# Patient Record
Sex: FEMALE | Race: BLACK OR AFRICAN AMERICAN | NOT HISPANIC OR LATINO | Employment: FULL TIME | URBAN - METROPOLITAN AREA
[De-identification: names, ages, dates, MRNs, and addresses within clinical notes are randomized per-mention and may not be internally consistent; named-entity substitution may affect disease eponyms.]

---

## 2024-05-09 ENCOUNTER — OFFICE VISIT (OUTPATIENT)
Dept: DERMATOLOGY | Facility: CLINIC | Age: 34
End: 2024-05-09
Payer: COMMERCIAL

## 2024-05-09 DIAGNOSIS — L73.2 HIDRADENITIS SUPPURATIVA: Primary | ICD-10-CM

## 2024-05-09 DIAGNOSIS — L20.9 ATOPIC DERMATITIS, UNSPECIFIED TYPE: ICD-10-CM

## 2024-05-09 PROCEDURE — 99204 OFFICE O/P NEW MOD 45 MIN: CPT | Performed by: NURSE PRACTITIONER

## 2024-05-09 RX ORDER — SPIRONOLACTONE 100 MG/1
100 TABLET, FILM COATED ORAL DAILY
Qty: 90 TABLET | Refills: 3 | Status: SHIPPED | OUTPATIENT
Start: 2024-05-09 | End: 2025-05-09

## 2024-05-09 RX ORDER — LEVONORGESTREL/ETHIN.ESTRADIOL 0.1-0.02MG
1 TABLET ORAL
COMMUNITY
Start: 2024-04-11

## 2024-05-09 RX ORDER — PROPRANOLOL HYDROCHLORIDE 80 MG/1
80 CAPSULE, EXTENDED RELEASE ORAL
COMMUNITY
Start: 2024-03-26 | End: 2024-06-24

## 2024-05-09 RX ORDER — FLUTICASONE PROPIONATE 50 MCG
SPRAY, SUSPENSION (ML) NASAL
COMMUNITY

## 2024-05-09 RX ORDER — TRIAMCINOLONE ACETONIDE 1 MG/G
CREAM TOPICAL 2 TIMES DAILY PRN
Qty: 80 G | Refills: 1 | Status: SHIPPED | OUTPATIENT
Start: 2024-05-09

## 2024-05-09 RX ORDER — LEVOTHYROXINE SODIUM 125 UG/1
250 TABLET ORAL DAILY
COMMUNITY

## 2024-05-09 RX ORDER — ALBUTEROL SULFATE 90 UG/1
2 AEROSOL, METERED RESPIRATORY (INHALATION) EVERY 4 HOURS PRN
COMMUNITY

## 2024-05-09 RX ORDER — DOXYCYCLINE HYCLATE 100 MG
100 TABLET ORAL 2 TIMES DAILY
Qty: 120 TABLET | Refills: 0 | Status: SHIPPED | OUTPATIENT
Start: 2024-05-09 | End: 2024-06-04

## 2024-05-09 RX ORDER — BENZOYL PEROXIDE 100 MG/ML
1 LIQUID TOPICAL DAILY
Qty: 237 G | Refills: 11 | Status: SHIPPED | OUTPATIENT
Start: 2024-05-09 | End: 2025-05-09

## 2024-05-09 RX ORDER — CLINDAMYCIN PHOSPHATE 10 UG/ML
LOTION TOPICAL 2 TIMES DAILY
Qty: 60 ML | Refills: 1 | Status: SHIPPED | OUTPATIENT
Start: 2024-05-09

## 2024-05-09 NOTE — PROGRESS NOTES
Subjective     Karo Friend is a 33 y.o. female who presents for the following: Hidradenitis Suppurativa, Eczema, and Seborrheic Dermatitis.       New patient visit patient in for hidradenitis suppurativa to axilla groin and breasts present off-and-on for years patient has tried clindamycin in the past states today that she is flaring in her axilla.    Patient states that she has had a history of allergies and has experienced eczema to hands and has tried triamcinolone in the past nothing currently.  Patient also describes itchy flaky scalp for 3 months has tried head and shoulders.        Review of Systems:  No other skin or systemic complaints other than what is documented elsewhere in the note.    The following portions of the chart were reviewed this encounter and updated as appropriate:       Skin Cancer History  No skin cancer on file.    Specialty Problems    None    Past Medical History:  Karo Friend  has a past medical history of Other conditions influencing health status.    Past Surgical History:  Karo Friend  has no past surgical history on file.    Family History:  Patient family history is not on file.    Social History:  Karo Friend  has no history on file for tobacco use, alcohol use, and drug use.    Allergies:  Patient has no known allergies.    Current Medications / CAM's:    Current Outpatient Medications:     levonorgestreL-ethinyl estrad (Aviane, Alesse, Lessina) 0.1-20 mg-mcg tablet, Take 1 tablet by mouth once daily., Disp: , Rfl:     propranolol LA (Inderal LA) 80 mg 24 hr capsule, Take 1 capsule (80 mg) by mouth once daily., Disp: , Rfl:     albuterol 90 mcg/actuation inhaler, Inhale 2 puffs every 4 hours if needed for wheezing or shortness of breath., Disp: , Rfl:     benzoyl peroxide (Benzac AC) 10 % external wash, Apply 1 Application topically once daily., Disp: 237 g, Rfl: 11    clindamycin (Cleocin T) 1 % lotion, Apply topically 2 times a day., Disp: 60 mL, Rfl: 1     "doxycycline (Vibra-Tabs) 100 mg tablet, Take 1 tablet (100 mg) by mouth 2 times a day. Take with a full glass of water and do not lie down for at least 30 minutes after., Disp: 120 tablet, Rfl: 0    fluticasone (Flonase) 50 mcg/actuation nasal spray, USE 1 SPRAY IN EACH NOSTRIL DAILY AT BEDTIME., Disp: , Rfl:     levothyroxine (Synthroid, Levoxyl) 125 mcg tablet, Take 2 tablets (250 mcg) by mouth once daily., Disp: , Rfl:     spironolactone (Aldactone) 100 mg tablet, Take 1 tablet (100 mg) by mouth once daily., Disp: 90 tablet, Rfl: 3    triamcinolone (Kenalog) 0.1 % cream, Apply topically 2 times a day as needed for rash., Disp: 80 g, Rfl: 1     Objective   Well appearing patient in no apparent distress; mood and affect are within normal limits.    Assessment/Plan   1. Hidradenitis suppurativa  Left Abdomen (side) - Lower, Left Axilla, Left Inframammary Fold, Pubic, Right Abdomen (side) - Lower, Right Axilla, Right Breast   Recurring tender, erythematous nodules, pustules, and abscesses that occur weeks or months apart. Disease progression with formation of tunnels, scars, and chronic purulent drainage can occur.    Hidradenitis suppurativa (HS), or acne inversa, is a chronic destructive inflammatory disorder of the hair follicles. While the pathogenesis is unclear, it is thought that follicular rupture initiates interaction between the follicular microbiome and innate immune system, triggering an inflammatory response.    HS is seen most commonly on the buttocks, breasts, groin, and axillae. Usually, the onset of the disease occurs soon after puberty, and patients typically report recurring \"boils.\" Pain is consistently reported as the most bothersome symptom, but itching, drainage, odor, fatigue, and arthralgias are frequent additional concerns.    Mechanical irritation such as by friction from tight clothing or shaving is often reported as a trigger. For many women, the week before menses can trigger disease " flares, and pregnancy and the postpartum period can be associated with either disease improvement or flaring.    Obesity and cigarette smoking are associated with HS severity. Prevalence of metabolic syndrome, major cardiovascular events, cardiac death, and diabetes (type 1 or type 2) are increased in HS compared to the general populations. Some of the most frequently associated comorbidities are related to mental health, and depression, anxiety, and risk of suicide are major burdens for the population.     A positive family history is reported in 30%-50% of patients.     PLAN:    Related Medications  spironolactone (Aldactone) 100 mg tablet  Take 1 tablet (100 mg) by mouth once daily.    benzoyl peroxide (Benzac AC) 10 % external wash  Apply 1 Application topically once daily.    clindamycin (Cleocin T) 1 % lotion  Apply topically 2 times a day.    doxycycline (Vibra-Tabs) 100 mg tablet  Take 1 tablet (100 mg) by mouth 2 times a day. Take with a full glass of water and do not lie down for at least 30 minutes after.    2. Atopic dermatitis, unspecified type  Erythematous scaly papules to bilateral hands as needed    Plan: Counseling.  I counseled the patient regarding the following:  Skin care: Patient should bathe using lukewarm water with a mild cleanser and moisturize immediately after. Emollients should be applied at least  2-3 times daily. Avoid scented detergents or fabric softeners. Keep fingernai ls short. Avoid excessive hand washing.  Expectations : The patient is aware that eczema is chronic in nature and can improve with moisturizers and topical steroids and worsen with stress,  scented soaps, detergents, scratching, dry skin, changes in weather and skin infections.  Contact office if:  Eczema worsens or fails to improve despite several weeks of treatment; patient develops skin infections (such as:yellow honey  colored crusts or cold sores).    PLAN:    triamcinolone (Kenalog) 0.1 % cream  Apply  topically 2 times a day as needed for rash.

## 2024-06-03 DIAGNOSIS — L73.2 HIDRADENITIS SUPPURATIVA: ICD-10-CM

## 2024-06-04 RX ORDER — DOXYCYCLINE HYCLATE 100 MG
100 TABLET ORAL 2 TIMES DAILY
Qty: 120 TABLET | Refills: 0 | Status: SHIPPED | OUTPATIENT
Start: 2024-06-04 | End: 2024-08-03

## 2024-07-08 ENCOUNTER — APPOINTMENT (OUTPATIENT)
Dept: DERMATOLOGY | Facility: CLINIC | Age: 34
End: 2024-07-08
Payer: COMMERCIAL

## 2025-06-13 ENCOUNTER — APPOINTMENT (OUTPATIENT)
Dept: DERMATOLOGY | Facility: CLINIC | Age: 35
End: 2025-06-13
Payer: COMMERCIAL

## 2025-06-13 DIAGNOSIS — L73.2 HIDRADENITIS SUPPURATIVA: ICD-10-CM

## 2025-06-13 DIAGNOSIS — L20.9 ATOPIC DERMATITIS, UNSPECIFIED TYPE: Primary | ICD-10-CM

## 2025-06-13 DIAGNOSIS — L21.9 SEBORRHEIC DERMATITIS: ICD-10-CM

## 2025-06-13 PROCEDURE — 99214 OFFICE O/P EST MOD 30 MIN: CPT | Performed by: NURSE PRACTITIONER

## 2025-06-13 RX ORDER — KETOCONAZOLE 20 MG/ML
SHAMPOO, SUSPENSION TOPICAL WEEKLY
Qty: 120 ML | Refills: 3 | Status: SHIPPED | OUTPATIENT
Start: 2025-06-13

## 2025-06-13 RX ORDER — TRIAMCINOLONE ACETONIDE 1 MG/G
CREAM TOPICAL 2 TIMES DAILY PRN
Qty: 80 G | Refills: 1 | Status: SHIPPED | OUTPATIENT
Start: 2025-06-13

## 2025-06-13 RX ORDER — BENZOYL PEROXIDE 100 MG/ML
1 LIQUID TOPICAL DAILY
Qty: 237 G | Refills: 11 | Status: SHIPPED | OUTPATIENT
Start: 2025-06-13 | End: 2026-06-13

## 2025-06-13 RX ORDER — SPIRONOLACTONE 100 MG/1
100 TABLET, FILM COATED ORAL DAILY
Qty: 90 TABLET | Refills: 3 | Status: SHIPPED | OUTPATIENT
Start: 2025-06-13 | End: 2026-06-13

## 2025-06-13 RX ORDER — DOXYCYCLINE 100 MG/1
100 CAPSULE ORAL 2 TIMES DAILY
Qty: 120 CAPSULE | Refills: 0 | Status: SHIPPED | OUTPATIENT
Start: 2025-06-13 | End: 2025-08-12

## 2025-06-13 RX ORDER — CLINDAMYCIN PHOSPHATE 10 UG/ML
LOTION TOPICAL 2 TIMES DAILY
Qty: 60 ML | Refills: 1 | Status: SHIPPED | OUTPATIENT
Start: 2025-06-13

## 2025-06-13 RX ORDER — CLOBETASOL PROPIONATE 0.5 MG/ML
SOLUTION TOPICAL 2 TIMES DAILY
Qty: 50 ML | Refills: 3 | Status: SHIPPED | OUTPATIENT
Start: 2025-06-13 | End: 2025-06-27

## 2025-06-13 NOTE — PROGRESS NOTES
Subjective     Karo Friend is a 34 y.o. female who presents for the following: Hidradenitis Suppurativa and Eczema.   Established patient in for virtual follow up last seen 05/2024  and prescribed BPO 10% wash, clindamycin 1% lotion, doxcycline 100mg tablet, spironolactone 100mg tablet.   Atopic dermatitis prescribed to use triamcinolone 0.1% cream as needed for flaring.       Review of Systems:  No other skin or systemic complaints other than what is documented elsewhere in the note.    The following portions of the chart were reviewed this encounter and updated as appropriate:       Skin Cancer History  Biopsy Log Book  No skin cancers from Specimen Tracking.    Additional History      Specialty Problems    None    Past Medical History:  Karo Friend  has a past medical history of Other conditions influencing health status.    Past Surgical History:  Karo Friend  has no past surgical history on file.    Family History:  Patient family history is not on file.    Social History:  Karo Friend  has no history on file for tobacco use, alcohol use, and drug use.    Allergies:  Patient has no known allergies.    Current Medications / CAM's:  Current Medications[1]     Objective   Well appearing patient in no apparent distress; mood and affect are within normal limits.      Assessment/Plan   Skin Exam  1. ATOPIC DERMATITIS, UNSPECIFIED TYPE  Generalized  Erythematous scaly papules to bilateral hands as needed.  She is clear today.  Plan: Counseling.  I counseled the patient regarding the following:  Skin care: Patient should bathe using lukewarm water with a mild cleanser and moisturize immediately after. Emollients should be applied at least  2-3 times daily. Avoid scented detergents or fabric softeners. Keep fingernai ls short. Avoid excessive hand washing.  Expectations : The patient is aware that eczema is chronic in nature and can improve with moisturizers and topical steroids and worsen with  "stress,  scented soaps, detergents, scratching, dry skin, changes in weather and skin infections.  Contact office if:  Eczema worsens or fails to improve despite several weeks of treatment; patient develops skin infections (such as:yellow honey  colored crusts or cold sores).    PLAN:  Related Procedures  Follow Up In Dermatology - Established Patient  Related Medications  triamcinolone (Kenalog) 0.1 % cream  Apply topically 2 times a day as needed for rash.  2. HIDRADENITIS SUPPURATIVA  Left Axilla, Left Inframammary Fold, Left Inguinal Area, Left Suprapubic Area, Pubic, Right Inframammary Fold, Right Inguinal Area, Right Suprapubic Area  Dilated comedones, inflammatory papules, sinus tract formation, scarring well controlled with use of topical clindamycin, BPO and spironolactone 100mg daily. She was flaring pretty significantly in November and December.   -Discussed nature of condition  -Discussed treatment options  -She is also using OTC MyMagic Healer   -Recommend  -Discussed biologics in the future  -Patient is planning on starting Mounjaro   benzoyl peroxide (Benzac AC) 10 % external wash - Left Axilla, Left Inframammary Fold, Left Inguinal Area, Left Suprapubic Area, Pubic, Right Inframammary Fold, Right Inguinal Area, Right Suprapubic Area  Apply 1 Application topically once daily.  Related Procedures  Follow Up In Dermatology - Established Patient  Related Medications  spironolactone (Aldactone) 100 mg tablet  Take 1 tablet (100 mg) by mouth once daily.  clindamycin (Cleocin T) 1 % lotion  Apply topically 2 times a day.  doxycycline (Monodox) 100 mg capsule  Take 1 capsule (100 mg) by mouth 2 times a day. Take with at least 8 ounces (large glass) of water, do not lie down for 30 minutes after  3. SEBORRHEIC DERMATITIS  Scalp  Erythematous macule(s)/patch(es) with greasy scale  -Discussed the nature of the diagnosis  -There is no \"cure\" for this condition. Treatments will need to be continued long term to " treat the condition  -Recommend:  ketoconazole (NIZOral) 2 % shampoo - Scalp  Apply topically 1 (one) time per week. To scalp  Related Procedures  Follow Up In Dermatology - Established Patient  Related Medications  clobetasol (Temovate) 0.05 % external solution  Apply topically 2 times a day for 14 days. As needed for itching in scalp.   Virtual or Telephone Consent    An interactive audio and video telecommunication system which permits real time communications between the patient (at the originating site) and provider (at the distant site) was utilized to provide this telehealth service.   Verbal consent was requested and obtained from Karo Friend on this date, 06/13/25 for a telehealth visit and the patient's location was confirmed at the time of the visit.         [1]   Current Outpatient Medications:     albuterol 90 mcg/actuation inhaler, Inhale 2 puffs every 4 hours if needed for wheezing or shortness of breath., Disp: , Rfl:     benzoyl peroxide (Benzac AC) 10 % external wash, Apply 1 Application topically once daily., Disp: 237 g, Rfl: 11    clindamycin (Cleocin T) 1 % lotion, Apply topically 2 times a day., Disp: 60 mL, Rfl: 1    clobetasol (Temovate) 0.05 % external solution, Apply topically 2 times a day for 14 days. As needed for itching in scalp., Disp: 50 mL, Rfl: 3    doxycycline (Monodox) 100 mg capsule, Take 1 capsule (100 mg) by mouth 2 times a day. Take with at least 8 ounces (large glass) of water, do not lie down for 30 minutes after, Disp: 120 capsule, Rfl: 0    fluticasone (Flonase) 50 mcg/actuation nasal spray, USE 1 SPRAY IN EACH NOSTRIL DAILY AT BEDTIME., Disp: , Rfl:     ketoconazole (NIZOral) 2 % shampoo, Apply topically 1 (one) time per week. To scalp, Disp: 120 mL, Rfl: 3    levonorgestreL-ethinyl estrad (Aviane, Alesse, Lessina) 0.1-20 mg-mcg tablet, Take 1 tablet by mouth once daily., Disp: , Rfl:     levothyroxine (Synthroid, Levoxyl) 125 mcg tablet, Take 2 tablets (250 mcg)  by mouth once daily., Disp: , Rfl:     spironolactone (Aldactone) 100 mg tablet, Take 1 tablet (100 mg) by mouth once daily., Disp: 90 tablet, Rfl: 3    triamcinolone (Kenalog) 0.1 % cream, Apply topically 2 times a day as needed for rash., Disp: 80 g, Rfl: 1

## 2025-06-13 NOTE — Clinical Note
"Hidradenitis suppurativa (HS), or acne inversa, is a chronic destructive inflammatory disorder of the hair follicles. While the pathogenesis is unclear, it is thought that follicular rupture initiates interaction between the follicular microbiome and innate immune system, triggering an inflammatory response.    HS is seen most commonly on the buttocks, breasts, groin, and axillae. Usually, the onset of the disease occurs soon after puberty, and patients typically report recurring \"boils.\" Pain is consistently reported as the most bothersome symptom, but itching, drainage, odor, fatigue, and arthralgias are frequent additional concerns.    Mechanical irritation such as by friction from tight clothing or shaving is often reported as a trigger. For many women, the week before menses can trigger disease flares, and pregnancy and the postpartum period can be associated with either disease improvement or flaring.    Obesity and cigarette smoking are associated with HS severity. Prevalence of metabolic syndrome, major cardiovascular events, cardiac death, and diabetes (type 1 or type 2) are increased in HS compared to the general populations. Some of the most frequently associated comorbidities are related to mental health, and depression, anxiety, and risk of suicide are major burdens for the population.     A positive family history is reported in 30%-50% of patients.     PLAN:  "

## 2025-06-13 NOTE — Clinical Note
Dilated comedones, inflammatory papules, sinus tract formation, scarring well controlled with use of topical clindamycin, BPO and spironolactone 100mg daily. She was flaring pretty significantly in November and December.

## 2025-06-13 NOTE — Clinical Note
-Discussed nature of condition  -Discussed treatment options  -She is also using OTC MyMagic Healer   -Recommend  -Discussed biologics in the future  -Patient is planning on starting Mounjaro

## 2025-06-13 NOTE — Clinical Note
"-Discussed the nature of the diagnosis  -There is no \"cure\" for this condition. Treatments will need to be continued long term to treat the condition  -Recommend:  "

## 2025-06-13 NOTE — Clinical Note
Plan: Counseling.  I counseled the patient regarding the following:  Skin care: Patient should bathe using lukewarm water with a mild cleanser and moisturize immediately after. Emollients should be applied at least  2-3 times daily. Avoid scented detergents or fabric softeners. Keep fingernai ls short. Avoid excessive hand washing.  Expectations : The patient is aware that eczema is chronic in nature and can improve with moisturizers and topical steroids and worsen with stress,  scented soaps, detergents, scratching, dry skin, changes in weather and skin infections.  Contact office if:  Eczema worsens or fails to improve despite several weeks of treatment; patient develops skin infections (such as:yellow honey  colored crusts or cold sores).    PLAN:

## 2025-06-23 ENCOUNTER — DOCUMENTATION (OUTPATIENT)
Dept: DERMATOLOGY | Facility: CLINIC | Age: 35
End: 2025-06-23
Payer: COMMERCIAL